# Patient Record
Sex: MALE | Race: BLACK OR AFRICAN AMERICAN | Employment: UNEMPLOYED | URBAN - METROPOLITAN AREA
[De-identification: names, ages, dates, MRNs, and addresses within clinical notes are randomized per-mention and may not be internally consistent; named-entity substitution may affect disease eponyms.]

---

## 2021-04-01 ENCOUNTER — OFFICE VISIT (OUTPATIENT)
Dept: ORTHOPEDIC SURGERY | Age: 31
End: 2021-04-01
Payer: COMMERCIAL

## 2021-04-01 VITALS
DIASTOLIC BLOOD PRESSURE: 76 MMHG | RESPIRATION RATE: 18 BRPM | BODY MASS INDEX: 21.48 KG/M2 | SYSTOLIC BLOOD PRESSURE: 118 MMHG | HEIGHT: 69 IN | HEART RATE: 72 BPM | WEIGHT: 145 LBS | TEMPERATURE: 97.1 F

## 2021-04-01 DIAGNOSIS — M54.50 LUMBAR PAIN: Primary | ICD-10-CM

## 2021-04-01 DIAGNOSIS — M54.16 LUMBAR RADICULOPATHY, CHRONIC: ICD-10-CM

## 2021-04-01 DIAGNOSIS — G89.29 OTHER CHRONIC PAIN: ICD-10-CM

## 2021-04-01 DIAGNOSIS — M51.36 BULGE OF LUMBAR DISC WITHOUT MYELOPATHY: ICD-10-CM

## 2021-04-01 PROCEDURE — 99203 OFFICE O/P NEW LOW 30 MIN: CPT | Performed by: NURSE PRACTITIONER

## 2021-04-01 NOTE — PROGRESS NOTES
04/01/21 Mustapha Arrington is a 31 y.o. male is here for   Chief Complaint   Patient presents with    Back Pain    New Patient   . Vitals:    04/01/21 0857   BP: 118/76   Pulse: 72   Resp: 18   Temp: 97.1 °F (36.2 °C)   TempSrc: Tympanic   Weight: 145 lb (65.8 kg)   Height: 5' 9\" (1.753 m)   PainSc:   9   PainLoc: Back    Body mass index is 21.41 kg/m². Chart reviewed including medical/surgical/family history. Allergies   Allergen Reactions    Flexeril [Cyclobenzaprine] Hives    has a current medication list which includes the following prescription(s): promethazine. Social History     Tobacco Use    Smoking status: Current Some Day Smoker     Packs/day: 0.25    Smokeless tobacco: Never Used   Substance Use Topics    Alcohol use: Never     Frequency: Never     Binge frequency: Never    Drug use: Not on file    Not Received     Is someone accompanying this pt? no    Is the patient using any DME equipment during OV? no    Pain Assessment:  Pain Assessment  4/1/2021   Location of Pain Leg;Back   Location Modifiers Right   Severity of Pain 9   Quality of Pain Throbbing; Sharp;Burning   Quality of Pain Comment numbness to right leg   Duration of Pain Persistent   Frequency of Pain Constant   Aggravating Factors Bending;Standing;Walking;Stairs;Squatting;Kneeling;Exercise;Straightening;Stretching   Limiting Behavior Yes   Relieving Factors Nothing         Learning Assessment:  Learning Assessment 4/1/2021   PRIMARY LEARNER Patient   PRIMARY LANGUAGE ENGLISH   LEARNER PREFERENCE PRIMARY LISTENING   ANSWERED BY patient   RELATIONSHIP SELF         Pt currently taking Antiplatelet therapy?  no
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
## 2022-02-24 ENCOUNTER — OFFICE VISIT (OUTPATIENT)
Dept: FAMILY MEDICINE CLINIC | Age: 32
End: 2022-02-24
Payer: COMMERCIAL

## 2022-02-24 VITALS
HEART RATE: 77 BPM | WEIGHT: 155.6 LBS | HEIGHT: 69 IN | OXYGEN SATURATION: 96 % | DIASTOLIC BLOOD PRESSURE: 88 MMHG | BODY MASS INDEX: 23.05 KG/M2 | SYSTOLIC BLOOD PRESSURE: 137 MMHG | TEMPERATURE: 98.3 F | RESPIRATION RATE: 18 BRPM

## 2022-02-24 DIAGNOSIS — Z13.0 SCREENING FOR DEFICIENCY ANEMIA: ICD-10-CM

## 2022-02-24 DIAGNOSIS — F17.200 SMOKER: ICD-10-CM

## 2022-02-24 DIAGNOSIS — Z83.3 FAMILY HISTORY OF DIABETES MELLITUS IN MOTHER: ICD-10-CM

## 2022-02-24 DIAGNOSIS — Z13.220 SCREENING, LIPID: ICD-10-CM

## 2022-02-24 DIAGNOSIS — Z11.59 ENCOUNTER FOR HEPATITIS C VIRUS SCREENING TEST FOR HIGH RISK PATIENT: ICD-10-CM

## 2022-02-24 DIAGNOSIS — Z82.49 FAMILY HISTORY OF HYPERTENSION IN MOTHER: ICD-10-CM

## 2022-02-24 DIAGNOSIS — M54.50 LUMBAR BACK PAIN: Primary | ICD-10-CM

## 2022-02-24 DIAGNOSIS — Z13.89 SCREENING FOR HEMATURIA OR PROTEINURIA: ICD-10-CM

## 2022-02-24 DIAGNOSIS — Z91.89 ENCOUNTER FOR HEPATITIS C VIRUS SCREENING TEST FOR HIGH RISK PATIENT: ICD-10-CM

## 2022-02-24 LAB
A-G RATIO,AGRAT: 1.7 RATIO (ref 1.1–2.6)
ABSOLUTE LYMPHOCYTE COUNT, 10803: 1.6 K/UL (ref 1–4.8)
ALBUMIN SERPL-MCNC: 4.5 G/DL (ref 3.5–5)
ALP SERPL-CCNC: 79 U/L (ref 25–115)
ALT SERPL-CCNC: 11 U/L (ref 5–40)
ANION GAP SERPL CALC-SCNC: 10 MMOL/L (ref 3–15)
AST SERPL W P-5'-P-CCNC: 14 U/L (ref 10–37)
AVG GLU, 10930: 112 MG/DL (ref 91–123)
BACTERIA,BACTU: NEGATIVE
BASOPHILS # BLD: 0 K/UL (ref 0–0.2)
BASOPHILS NFR BLD: 1 % (ref 0–2)
BILIRUB SERPL-MCNC: 0.3 MG/DL (ref 0.2–1.2)
BILIRUB UR QL: NEGATIVE
BUN SERPL-MCNC: 11 MG/DL (ref 6–22)
CALCIUM SERPL-MCNC: 9 MG/DL (ref 8.4–10.5)
CHLORIDE SERPL-SCNC: 105 MMOL/L (ref 98–110)
CHOLEST SERPL-MCNC: 195 MG/DL (ref 110–200)
CLARITY: CLEAR
CO2 SERPL-SCNC: 26 MMOL/L (ref 20–32)
COLOR UR: YELLOW
CREAT SERPL-MCNC: 1.1 MG/DL (ref 0.5–1.2)
EOSINOPHIL # BLD: 0.1 K/UL (ref 0–0.5)
EOSINOPHIL NFR BLD: 1 % (ref 0–6)
EPITHELIAL,EPSU: NORMAL /HPF (ref 0–2)
ERYTHROCYTE [DISTWIDTH] IN BLOOD BY AUTOMATED COUNT: 13.1 % (ref 10–15.5)
GFRAA, 66117: >60
GFRNA, 66118: >60
GLOBULIN,GLOB: 2.7 G/DL (ref 2–4)
GLUCOSE SERPL-MCNC: 87 MG/DL (ref 70–99)
GLUCOSE UR QL: NEGATIVE MG/DL
GRANULOCYTES,GRANS: 55 % (ref 40–75)
HBA1C MFR BLD HPLC: 5.5 % (ref 4.8–5.6)
HCT VFR BLD AUTO: 43 % (ref 36.6–51.9)
HCV AB SER IA-ACNC: NORMAL
HDLC SERPL-MCNC: 3.3 MG/DL (ref 0–5)
HDLC SERPL-MCNC: 59 MG/DL
HGB BLD-MCNC: 14.3 G/DL (ref 13.2–17.3)
HGB UR QL STRIP: NEGATIVE
HYLINE CAST, 6014: NORMAL /LPF (ref 0–2)
KETONES UR QL STRIP.AUTO: NEGATIVE MG/DL
LDL/HDL RATIO,LDHD: 2.2
LDLC SERPL CALC-MCNC: 127 MG/DL (ref 50–99)
LEUKOCYTE ESTERASE: NEGATIVE
LYMPHOCYTES, LYMLT: 36 % (ref 20–45)
MCH RBC QN AUTO: 30 PG (ref 26–34)
MCHC RBC AUTO-ENTMCNC: 33 G/DL (ref 31–36)
MCV RBC AUTO: 90 FL (ref 80–95)
MONOCYTES # BLD: 0.3 K/UL (ref 0.1–1)
MONOCYTES NFR BLD: 7 % (ref 3–12)
NEUTROPHILS # BLD AUTO: 2.4 K/UL (ref 1.8–7.7)
NITRITE UR QL STRIP.AUTO: NEGATIVE
NON-HDL CHOLESTEROL, 011976: 136 MG/DL
PH UR STRIP: 7 PH (ref 5–8)
PLATELET # BLD AUTO: 208 K/UL (ref 140–440)
PMV BLD AUTO: 10.8 FL (ref 9–13)
POTASSIUM SERPL-SCNC: 4.4 MMOL/L (ref 3.5–5.5)
PROT SERPL-MCNC: 7.2 G/DL (ref 6.4–8.3)
PROT UR QL STRIP: NEGATIVE MG/DL
RBC # BLD AUTO: 4.79 M/UL (ref 3.8–5.8)
RBC #/AREA URNS HPF: NORMAL /HPF
SODIUM SERPL-SCNC: 141 MMOL/L (ref 133–145)
SOURCE OF URINE, 17028: NORMAL
SP GR UR: 1 (ref 1–1.03)
TRIGL SERPL-MCNC: 47 MG/DL (ref 40–149)
UROBILINOGEN UR STRIP-MCNC: 0.2 MG/DL
VLDLC SERPL CALC-MCNC: 9 MG/DL (ref 8–30)
WBC # BLD AUTO: 4.4 K/UL (ref 4–11)
WBC URNS QL MICRO: NORMAL /HPF (ref 0–2)

## 2022-02-24 PROCEDURE — 99204 OFFICE O/P NEW MOD 45 MIN: CPT | Performed by: NURSE PRACTITIONER

## 2022-02-24 RX ORDER — METHYLPREDNISOLONE 4 MG/1
TABLET ORAL
Qty: 1 DOSE PACK | Refills: 0 | Status: SHIPPED | OUTPATIENT
Start: 2022-02-24

## 2022-02-24 RX ORDER — GUAIFENESIN 1200 MG
TABLET, EXTENDED RELEASE 12 HR ORAL
COMMUNITY
End: 2022-03-11 | Stop reason: ALTCHOICE

## 2022-02-24 RX ORDER — METHOCARBAMOL 500 MG/1
500 TABLET, FILM COATED ORAL 4 TIMES DAILY
Qty: 30 TABLET | Refills: 1 | Status: SHIPPED | OUTPATIENT
Start: 2022-02-24 | End: 2022-02-28 | Stop reason: SINTOL
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
## 2022-02-24 NOTE — PROGRESS NOTES
Patient declined Flu vaccine today. Patient do not have covid vaccine. Kwasi Forde presents today for   Chief Complaint   Patient presents with    Establish Care    Back Pain       Is someone accompanying this pt? no    Is the patient using any DME equipment during OV? No     Depression Screening:  3 most recent PHQ Screens 2/24/2022   Little interest or pleasure in doing things Not at all   Feeling down, depressed, irritable, or hopeless Not at all   Total Score PHQ 2 0       Learning Assessment:  Learning Assessment 4/1/2021   PRIMARY LEARNER Patient   PRIMARY LANGUAGE ENGLISH   LEARNER PREFERENCE PRIMARY LISTENING   ANSWERED BY patient   RELATIONSHIP SELF       Health Maintenance reviewed and discussed and ordered per Provider. Health Maintenance Due   Topic Date Due    Hepatitis C Screening  Never done    Depression Screen  Never done    COVID-19 Vaccine (1) Never done    Pneumococcal 0-64 years (1 of 2 - PPSV23) Never done    DTaP/Tdap/Td series (1 - Tdap) Never done    Flu Vaccine (1) Never done   . Coordination of Care:  1. Have you been to the ER, urgent care clinic since your last visit? Hospitalized since your last visit? Yes [facility] Back pain January 2022     2. Have you seen or consulted any other health care providers outside of the [address] [place] [address] since your last visit? Include any pap smears or colon screening. No      3. For patients aged 39-70: Has the patient had a colonoscopy / FIT/ Cologuard? NA - based on age      If the patient is female:    4. For patients aged 41-77: Has the patient had a mammogram within the past 2 years? NA - based on age or sex      11. For patients aged 21-65: Has the patient had a pap smear?  NA - based on age or sex

## 2022-02-28 DIAGNOSIS — M54.50 LUMBAR BACK PAIN: ICD-10-CM

## 2022-02-28 DIAGNOSIS — E78.00 ELEVATED LOW DENSITY LIPOPROTEIN (LDL) CHOLESTEROL LEVEL: Primary | ICD-10-CM

## 2022-02-28 RX ORDER — LIDOCAINE 50 MG/G
PATCH TOPICAL
Qty: 1 EACH | Refills: 3 | Status: SHIPPED | OUTPATIENT
Start: 2022-02-28

## 2022-02-28 RX ORDER — GLUCOSAM/CHONDRO/HERB 149/HYAL 750-100 MG
1 TABLET ORAL DAILY
Qty: 90 CAPSULE | Refills: 1 | Status: SHIPPED | OUTPATIENT
Start: 2022-02-28

## 2022-02-28 NOTE — PROGRESS NOTES
Can you please call patient to let the patient know that his labs is normal. Thanks! Patient's LDL is slightly elevated at 127; advise low cholesterol diet and will prescribe fish oil.

## 2022-03-03 DIAGNOSIS — K04.7 TOOTH INFECTION: Primary | ICD-10-CM

## 2022-03-03 RX ORDER — AMOXICILLIN AND CLAVULANATE POTASSIUM 875; 125 MG/1; MG/1
1 TABLET, FILM COATED ORAL EVERY 12 HOURS
Qty: 20 TABLET | Refills: 0 | Status: SHIPPED | OUTPATIENT
Start: 2022-03-03 | End: 2022-03-13

## 2022-03-11 ENCOUNTER — TELEPHONE (OUTPATIENT)
Dept: FAMILY MEDICINE CLINIC | Age: 32
End: 2022-03-11

## 2022-03-11 DIAGNOSIS — M54.50 LUMBAR BACK PAIN: ICD-10-CM

## 2022-03-11 DIAGNOSIS — M54.50 LUMBAR BACK PAIN: Primary | ICD-10-CM

## 2022-03-11 RX ORDER — HYDROCODONE BITARTRATE AND ACETAMINOPHEN 5; 325 MG/1; MG/1
1 TABLET ORAL
Qty: 21 TABLET | Refills: 0 | Status: SHIPPED | OUTPATIENT
Start: 2022-03-11 | End: 2022-03-11 | Stop reason: SDUPTHER

## 2022-03-11 RX ORDER — HYDROCODONE BITARTRATE AND ACETAMINOPHEN 5; 325 MG/1; MG/1
1 TABLET ORAL
Qty: 21 TABLET | Refills: 0 | Status: SHIPPED | OUTPATIENT
Start: 2022-03-11 | End: 2022-03-18

## 2022-03-11 NOTE — TELEPHONE ENCOUNTER
[facility]  Subject: Message to Provider     QUESTIONS   Information for Provider? Patient is calling about pain management   appointment where they could not do anything for him. He is wanting a call   back today. He says this is urgent. Wants to know what can be done now. Please advise. Thanks   ---------------------------------------------------------------------------   --------------   Rebecca Mayo INFO   What is the best way for the office to contact you? OK to leave message on   voicemail,Do not leave any message, patient will call back for answer   Preferred Call Back Phone Number? [phone]   ---------------------------------------------------------------------------   --------------   SCRIPT ANSWERS   Relationship to Patient?  Self

## 2022-03-11 NOTE — TELEPHONE ENCOUNTER
----- Message from Sharif Flower sent at 3/10/2022  8:57 AM EST -----  Subject: Message to Provider    QUESTIONS  Information for Provider? Patient called today requesting a callback from   PCP ASAP. Patient stated he was seeing a specialist for back pain. Pain   Management has advised him there was nothing else she could do to assist   because patient is refusing procedures and treatments. Patient stated he   has tried the procedures in the past and they further aggravate his   symptoms. Patient is in severe pain and would like to speak with PCP about   next steps. Please advise.   ---------------------------------------------------------------------------  --------------  CALL BACK INFO  What is the best way for the office to contact you? OK to leave message on   voicemail  Preferred Call Back Phone Number? [phone]  ---------------------------------------------------------------------------  --------------  SCRIPT ANSWERS  Relationship to Patient?  Self

## 2022-03-11 NOTE — TELEPHONE ENCOUNTER
Patient saw pain management recently and refused injections/treatments. Pt states injections aggravated the issue and was frustrated with the pain management clinic. Pt states previously saw pain management in [place]. Pt is refusing surgery; refusing PT. Pt states feels like being labeled as a drug abuser but pt has no history. Pt doesn't want surgery because has small children. Pt is in [place] visiting 1year old daughter and asking if norco can be sent to [facility] in [place]. Advised patient WILL NOT CONTINUE TO PRESCRIBE THIS MEDICATION TO HIM; PT VOICED UNDERSTANDING. [facility] out of stock with medication so sent to [facility].

## 2022-03-15 ENCOUNTER — TELEPHONE (OUTPATIENT)
Dept: FAMILY MEDICINE CLINIC | Age: 32
End: 2022-03-15

## 2022-03-28 ENCOUNTER — TELEPHONE (OUTPATIENT)
Dept: FAMILY MEDICINE CLINIC | Age: 32
End: 2022-03-28

## 2022-03-28 DIAGNOSIS — M54.50 LUMBAR BACK PAIN: ICD-10-CM

## 2022-03-28 RX ORDER — HYDROCODONE BITARTRATE AND ACETAMINOPHEN 5; 325 MG/1; MG/1
1 TABLET ORAL
Qty: 21 TABLET | Refills: 0 | Status: CANCELLED | OUTPATIENT
Start: 2022-03-28 | End: 2022-04-04

## 2022-03-28 NOTE — TELEPHONE ENCOUNTER
Returned call to patient. I advised patient to stop HYDROcodone-acetaminophen (NORCO) 5-325 mg per tablet  and he can take Benadryl for the itching. Patient stated he stopped the  HYDROcodone-acetaminophen (NORCO) 5-325 mg per tablet on  Thursday 3-24-22. Patient reports that rash is just about gone. Patient is requesting a return call [phone].

## 2022-03-28 NOTE — TELEPHONE ENCOUNTER
----- Message from [facility] sent at 3/28/2022  3:10 PM EDT -----  Subject: Message to Provider    QUESTIONS  Information for Provider? Pt is calling about HYDROcodone-acetaminophen   (Lizz Klein) 5-325 that was prescribed to the pt. It is now breaking him out   with hives. Pt would like it if someone would call him back ASAP at   [phone].   ---------------------------------------------------------------------------  --------------  CALL BACK INFO  What is the best way for the office to contact you? OK to leave message on   voicemail  Preferred Call Back Phone Number? [phone]  ---------------------------------------------------------------------------  --------------  SCRIPT ANSWERS  Relationship to Patient?  Self

## 2022-03-28 NOTE — TELEPHONE ENCOUNTER
Spoke to patient about pain medication, patient states he was taking hydrocodone and oxycodone for pain at the pain management doctor. Patient inform me that he stopped taking the hydrocodone medication due to rash.

## 2022-03-29 NOTE — TELEPHONE ENCOUNTER
Phone call from patient stating he knows why the medication gave him a rash. He stated it was Hydrocodone - \"a bit word\" and acetaminophen. I advised him that was not what NP Stephen Gray sent to the [facility] pharmacy. I advised him we would need to see the bottle. Note the patient received  Hydrocodone from the [address] [place] [address] Ave on 2/14 from Tomasa Mclaughlin MD.  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 The physician also canceled the prescription on 2/17 but it was already picked up. This information comes from the [facility] in [facility]. Mr. Shayy Edwards stated he was in [facility]  supporting his daughter. Also Mr. Astudillo stated he needed to speak with the nurse regarding his message from yesterday.     Call back number [phone]

## 2022-04-18 ENCOUNTER — DOCUMENTATION ONLY (OUTPATIENT)
Dept: FAMILY MEDICINE CLINIC | Age: 32
End: 2022-04-18

## 2022-04-18 NOTE — PROGRESS NOTES
Patient called to complain about having the phone hung up in his ear. Patient then stated if he would have known that his provider wasn't going to continue to refill his HYDROcodone-acetaminophen (NORCO) 5-325 mg per tablet     Weeks ago ,than he would not have to call the office being irate with the staff. Patient stated he is in the process of finding a new PCP.

## 2022-04-18 NOTE — PROGRESS NOTES
Spoke with patient concerning office policies of prescribing controlled narcotics. Staff explained to Mr. Astudillo that an office visit was needed in order to assess pain and gave the information of the referral to pain management that setup for him in Feb.  Mr. Andreas Tamez stated he was unable to see pain management due to him being out of town, then stated pain management will not accept his insurance and he can only have virtual appointments with both providers. Mr. Andreas Tamez became upset, yelling on the phone when told the next virtual appointment was available May 2nd. He began to curse at staff, stating he had waited for two weeks and will not wait longer to be seen in order to receive pain medication. Mr. Andreas Tamez was asked to stop yelling in staff's ear and cursing but he would not, staff then disconnected the call, and spoke to NP [facility]. Per NP [facility], medication was prescribed as a one time ordeal and a referral to pain management was initiated. NP [facility] stated she no longer will prescribe pain medications and patient can find a new provider if he is unwilling to comply to office procedures. Patient was informed by  verbally, patient stated,\" I would not to have get irate if I was told this two weeks ago\".

## 2022-04-19 ENCOUNTER — TELEPHONE (OUTPATIENT)
Dept: FAMILY MEDICINE CLINIC | Age: 32
End: 2022-04-19

## 2022-04-19 NOTE — TELEPHONE ENCOUNTER
----- Message from Kyra Kaufman sent at 4/18/2022 12:18 PM EDT -----  Subject: Message to Provider    QUESTIONS  Information for Provider? Pt called in 2 weeks ago, about his medication   he said he was in pain management before but they were not accepting new   pt he said he was on oxycodone-acetaminophen and he would like this get   this called in to [facility] in [place], [address] . He said he has been   waiting on a call about this for over 2 weeks. Pt said he started a new   job and has been missing days because he has not had the medication . The   medication for pain norco did not work. Pt needs a note for missing work   since he has been off.  ---------------------------------------------------------------------------  --------------  [address]  What is the best way for the office to contact you? OK to leave message on   voicemail  Preferred Call Back Phone Number? [phone]  ---------------------------------------------------------------------------  --------------  SCRIPT ANSWERS  Relationship to Patient?  Self

## 2022-04-19 NOTE — TELEPHONE ENCOUNTER
This provider is discharging patient from the practice for escalated behavior towards staff members regarding narcotic pain medications. Pt was advised that this office will not continue to prescribe these medications when initially filled in [place], [place] 2 months ago. Pt has not followed up with pain management referral. Pt is currently asking for work note from being out of work. Notified  that this provider does not want to continue to see patient.

## 2022-05-16 DIAGNOSIS — Z82.49 FAMILY HISTORY OF HYPERTENSION IN MOTHER: ICD-10-CM

## 2022-05-16 DIAGNOSIS — F17.200 SMOKER: ICD-10-CM

## 2022-05-16 DIAGNOSIS — Z11.59 ENCOUNTER FOR HEPATITIS C VIRUS SCREENING TEST FOR HIGH RISK PATIENT: ICD-10-CM

## 2022-05-16 DIAGNOSIS — Z91.89 ENCOUNTER FOR HEPATITIS C VIRUS SCREENING TEST FOR HIGH RISK PATIENT: ICD-10-CM

## 2022-05-16 DIAGNOSIS — Z83.3 FAMILY HISTORY OF DIABETES MELLITUS IN MOTHER: ICD-10-CM

## 2022-05-16 DIAGNOSIS — Z13.0 SCREENING FOR DEFICIENCY ANEMIA: ICD-10-CM

## 2022-05-16 DIAGNOSIS — Z13.220 SCREENING, LIPID: ICD-10-CM

## 2022-05-16 DIAGNOSIS — Z13.89 SCREENING FOR HEMATURIA OR PROTEINURIA: ICD-10-CM
